# Patient Record
Sex: FEMALE | Race: WHITE
[De-identification: names, ages, dates, MRNs, and addresses within clinical notes are randomized per-mention and may not be internally consistent; named-entity substitution may affect disease eponyms.]

---

## 2019-04-12 ENCOUNTER — HOSPITAL ENCOUNTER (OUTPATIENT)
Dept: HOSPITAL 10 - CCL | Age: 69
Discharge: HOME | End: 2019-04-12
Attending: INTERNAL MEDICINE
Payer: COMMERCIAL

## 2019-04-12 ENCOUNTER — HOSPITAL ENCOUNTER (OUTPATIENT)
Dept: HOSPITAL 91 - CCL | Age: 69
Discharge: HOME | End: 2019-04-12
Payer: COMMERCIAL

## 2019-04-12 VITALS — SYSTOLIC BLOOD PRESSURE: 148 MMHG | DIASTOLIC BLOOD PRESSURE: 72 MMHG | HEART RATE: 71 BPM | RESPIRATION RATE: 16 BRPM

## 2019-04-12 VITALS
WEIGHT: 155.21 LBS | WEIGHT: 155.21 LBS | HEIGHT: 65 IN | BODY MASS INDEX: 25.86 KG/M2 | BODY MASS INDEX: 25.86 KG/M2 | HEIGHT: 65 IN

## 2019-04-12 VITALS — HEART RATE: 70 BPM | DIASTOLIC BLOOD PRESSURE: 78 MMHG | RESPIRATION RATE: 15 BRPM | SYSTOLIC BLOOD PRESSURE: 161 MMHG

## 2019-04-12 VITALS — HEART RATE: 70 BPM | SYSTOLIC BLOOD PRESSURE: 162 MMHG | RESPIRATION RATE: 13 BRPM | DIASTOLIC BLOOD PRESSURE: 82 MMHG

## 2019-04-12 VITALS — HEART RATE: 68 BPM | RESPIRATION RATE: 15 BRPM | SYSTOLIC BLOOD PRESSURE: 163 MMHG | DIASTOLIC BLOOD PRESSURE: 89 MMHG

## 2019-04-12 VITALS — SYSTOLIC BLOOD PRESSURE: 136 MMHG | RESPIRATION RATE: 15 BRPM | HEART RATE: 76 BPM | DIASTOLIC BLOOD PRESSURE: 70 MMHG

## 2019-04-12 VITALS — HEART RATE: 75 BPM | RESPIRATION RATE: 16 BRPM | SYSTOLIC BLOOD PRESSURE: 176 MMHG | DIASTOLIC BLOOD PRESSURE: 79 MMHG

## 2019-04-12 VITALS — SYSTOLIC BLOOD PRESSURE: 159 MMHG | DIASTOLIC BLOOD PRESSURE: 80 MMHG | RESPIRATION RATE: 18 BRPM | HEART RATE: 76 BPM

## 2019-04-12 DIAGNOSIS — I10: ICD-10-CM

## 2019-04-12 DIAGNOSIS — I25.10: Primary | ICD-10-CM

## 2019-04-12 DIAGNOSIS — E11.9: ICD-10-CM

## 2019-04-12 LAB
ADD MAN DIFF?: NO
ANION GAP: 9 (ref 5–13)
BASOPHIL #: 0.1 10^3/UL (ref 0–0.1)
BASOPHILS %: 0.8 % (ref 0–2)
BLOOD UREA NITROGEN: 14 MG/DL (ref 7–20)
CALCIUM: 10 MG/DL (ref 8.4–10.2)
CARBON DIOXIDE: 27 MMOL/L (ref 21–31)
CHLORIDE: 106 MMOL/L (ref 97–110)
CREATININE: 0.82 MG/DL (ref 0.44–1)
EOSINOPHILS #: 0.2 10^3/UL (ref 0–0.5)
EOSINOPHILS %: 3.2 % (ref 0–7)
GLUCOSE: 105 MG/DL (ref 70–220)
HEMATOCRIT: 38.5 % (ref 37–47)
HEMOGLOBIN: 12.7 G/DL (ref 12–16)
IMMATURE GRANS #M: 0.02 10^3/UL (ref 0–0.03)
IMMATURE GRANS % (M): 0.3 % (ref 0–0.43)
INR: 0.89
LYMPHOCYTES #: 2.7 10^3/UL (ref 0.8–2.9)
LYMPHOCYTES %: 36.9 % (ref 15–51)
MEAN CORPUSCULAR HEMOGLOBIN: 27.7 PG (ref 29–33)
MEAN CORPUSCULAR HGB CONC: 33 G/DL (ref 32–37)
MEAN CORPUSCULAR VOLUME: 83.9 FL (ref 82–101)
MEAN PLATELET VOLUME: 10.1 FL (ref 7.4–10.4)
MONOCYTE #: 0.6 10^3/UL (ref 0.3–0.9)
MONOCYTES %: 7.5 % (ref 0–11)
NEUTROPHIL #: 3.8 10^3/UL (ref 1.6–7.5)
NEUTROPHILS %: 51.3 % (ref 39–77)
NUCLEATED RED BLOOD CELLS #: 0 10^3/UL (ref 0–0)
NUCLEATED RED BLOOD CELLS%: 0 /100WBC (ref 0–0)
PLATELET COUNT: 246 10^3/UL (ref 140–415)
POTASSIUM: 3.7 MMOL/L (ref 3.5–5.1)
PROTIME: 12.2 SEC (ref 11.9–14.9)
PT RATIO: 1
RED BLOOD COUNT: 4.59 10^6/UL (ref 4.2–5.4)
RED CELL DISTRIBUTION WIDTH: 13.7 % (ref 11.5–14.5)
SODIUM: 142 MMOL/L (ref 135–144)
WHITE BLOOD COUNT: 7.4 10^3/UL (ref 4.8–10.8)

## 2019-04-12 PROCEDURE — 93005 ELECTROCARDIOGRAM TRACING: CPT

## 2019-04-12 PROCEDURE — C1887 CATHETER, GUIDING: HCPCS

## 2019-04-12 PROCEDURE — 85025 COMPLETE CBC W/AUTO DIFF WBC: CPT

## 2019-04-12 PROCEDURE — 85610 PROTHROMBIN TIME: CPT

## 2019-04-12 PROCEDURE — 93454 CORONARY ARTERY ANGIO S&I: CPT

## 2019-04-12 PROCEDURE — 80048 BASIC METABOLIC PNL TOTAL CA: CPT

## 2019-04-12 NOTE — OPR
Date/Time of Note


Date/Time of Note


DATE: 4/12/19 


TIME: 17:50





Operative Report


Procedure Date:  Apr 12, 2019


Preoperative Diagnosis


Chest pain


Postoperative Diagnosis


Non-obstructive coronary atherosclerosis


Operation/Procedure Performed


Left heart catheterization


Surgeon


see signature line


Assistant


none


Anesthesia Type:  moderate sedation


Estimated Blood Loss:  minimal


Transfusion


   none


Specimen


none


Grafts/Implants


none


Complications


none


Pt Condition Post Procedure:  stable


Disposition:  PACU


Procedure Description


 


DESCRIPTION OF PROCEDURE:  The patient placed on cardiac monitor, pulse oximetry


and supplemental oxygen as necessary.  The right wrist was prepped and draped in


a sterile fashion and infiltrated with 1% lidocaine.  Via the Seldinger 


technique, the right radial artery was accessed.  A 5-Kiswahili sheath was inserted


and through this the right coronary catheter and left coronary catheter and 


pigtail were advanced into the right coronary artery and left coronary artery 


and the left ventricle.  Placement confirmed by fluoroscopy and hemodynamics.


 


CATHETERIZATION FINDINGS:


1.  Left main:  No significant disease.


2.  LAD:  Large caliber vessel with no significant disease.


3.  Circumflex:  Medium caliber vessel with no significant disease.


4.  Obtuse marginal:  Medium caliber vessel with no significant disease.


5.  RCA:  Large dominant vessel with proximal 20-30% plaque


 





COMPLICATIONS:  None.


 


FINAL RESULTS:  Non-obstructive coronary atherosclerosis


 


RECOMMENDATIONS:


Medical Management


Discharge home











MIKKI MORILLO                  Apr 12, 2019 17:52

## 2019-04-16 NOTE — RADRPT
Vent Rate: 69 bpm

RR Interval: 0 msec

AK Interval: 198 msec

QRS Duration: 98 msec

QT Interval: 392 msec

QTC Interval: 420 msec

P-R-T Axis: 47 - -3 - 111 degrees

 

 Normal sinus rhythm

 Left ventricular hypertrophy with repolarization abnormality

 Abnormal ECG

 

Electronically Signed By: Jacinto Thurston